# Patient Record
Sex: MALE | Race: WHITE | HISPANIC OR LATINO | URBAN - METROPOLITAN AREA
[De-identification: names, ages, dates, MRNs, and addresses within clinical notes are randomized per-mention and may not be internally consistent; named-entity substitution may affect disease eponyms.]

---

## 2017-10-22 ENCOUNTER — EMERGENCY (EMERGENCY)
Facility: HOSPITAL | Age: 32
LOS: 1 days | End: 2017-10-22
Payer: SELF-PAY

## 2017-10-22 PROCEDURE — 99285 EMERGENCY DEPT VISIT HI MDM: CPT

## 2017-10-22 PROCEDURE — 70450 CT HEAD/BRAIN W/O DYE: CPT | Mod: 26

## 2018-06-18 ENCOUNTER — EMERGENCY (EMERGENCY)
Facility: HOSPITAL | Age: 33
LOS: 1 days | End: 2018-06-18
Payer: SELF-PAY

## 2018-06-18 PROCEDURE — 99283 EMERGENCY DEPT VISIT LOW MDM: CPT

## 2018-07-24 ENCOUNTER — EMERGENCY (EMERGENCY)
Facility: HOSPITAL | Age: 33
LOS: 1 days | End: 2018-07-24
Payer: SELF-PAY

## 2018-07-24 PROCEDURE — 99283 EMERGENCY DEPT VISIT LOW MDM: CPT

## 2018-07-28 ENCOUNTER — EMERGENCY (EMERGENCY)
Facility: HOSPITAL | Age: 33
LOS: 1 days | End: 2018-07-28
Payer: SELF-PAY

## 2018-07-28 PROCEDURE — 99284 EMERGENCY DEPT VISIT MOD MDM: CPT | Mod: 25

## 2018-07-28 PROCEDURE — 99053 MED SERV 10PM-8AM 24 HR FAC: CPT

## 2018-07-30 ENCOUNTER — EMERGENCY (EMERGENCY)
Facility: HOSPITAL | Age: 33
LOS: 1 days | End: 2018-07-30
Payer: SELF-PAY

## 2018-07-30 PROCEDURE — 99284 EMERGENCY DEPT VISIT MOD MDM: CPT | Mod: 25

## 2018-07-30 PROCEDURE — 72131 CT LUMBAR SPINE W/O DYE: CPT | Mod: 26

## 2018-07-30 PROCEDURE — 99053 MED SERV 10PM-8AM 24 HR FAC: CPT

## 2018-10-06 ENCOUNTER — EMERGENCY (EMERGENCY)
Facility: HOSPITAL | Age: 33
LOS: 1 days | End: 2018-10-06
Payer: MEDICAID

## 2018-10-06 PROCEDURE — 99283 EMERGENCY DEPT VISIT LOW MDM: CPT

## 2018-11-21 ENCOUNTER — EMERGENCY (EMERGENCY)
Facility: HOSPITAL | Age: 33
LOS: 1 days | End: 2018-11-21
Payer: MEDICAID

## 2018-11-21 PROCEDURE — 99284 EMERGENCY DEPT VISIT MOD MDM: CPT

## 2019-05-02 ENCOUNTER — EMERGENCY (EMERGENCY)
Facility: HOSPITAL | Age: 34
LOS: 1 days | End: 2019-05-02
Admitting: EMERGENCY MEDICINE
Payer: MEDICAID

## 2019-05-02 PROCEDURE — 99284 EMERGENCY DEPT VISIT MOD MDM: CPT | Mod: 25

## 2019-07-09 PROBLEM — Z00.00 ENCOUNTER FOR PREVENTIVE HEALTH EXAMINATION: Status: ACTIVE | Noted: 2019-07-09

## 2019-07-17 ENCOUNTER — APPOINTMENT (OUTPATIENT)
Dept: ULTRASOUND IMAGING | Facility: CLINIC | Age: 34
End: 2019-07-17

## 2019-11-22 ENCOUNTER — EMERGENCY (EMERGENCY)
Facility: HOSPITAL | Age: 34
LOS: 1 days | End: 2019-11-22
Admitting: EMERGENCY MEDICINE
Payer: MEDICAID

## 2019-11-22 PROCEDURE — 71046 X-RAY EXAM CHEST 2 VIEWS: CPT | Mod: 26

## 2019-11-22 PROCEDURE — 99283 EMERGENCY DEPT VISIT LOW MDM: CPT

## 2019-11-23 PROCEDURE — 93010 ELECTROCARDIOGRAM REPORT: CPT

## 2019-12-11 ENCOUNTER — APPOINTMENT (OUTPATIENT)
Dept: CARDIOLOGY | Facility: CLINIC | Age: 34
End: 2019-12-11
Payer: SELF-PAY

## 2019-12-11 VITALS
HEART RATE: 72 BPM | WEIGHT: 160 LBS | HEIGHT: 65 IN | OXYGEN SATURATION: 97 % | SYSTOLIC BLOOD PRESSURE: 120 MMHG | DIASTOLIC BLOOD PRESSURE: 60 MMHG | BODY MASS INDEX: 26.66 KG/M2

## 2019-12-11 DIAGNOSIS — Z87.891 PERSONAL HISTORY OF NICOTINE DEPENDENCE: ICD-10-CM

## 2019-12-11 DIAGNOSIS — Z78.9 OTHER SPECIFIED HEALTH STATUS: ICD-10-CM

## 2019-12-11 DIAGNOSIS — Z72.3 LACK OF PHYSICAL EXERCISE: ICD-10-CM

## 2019-12-11 PROCEDURE — 99215 OFFICE O/P EST HI 40 MIN: CPT

## 2019-12-11 RX ORDER — IBUPROFEN 600 MG/1
600 TABLET, FILM COATED ORAL DAILY
Refills: 0 | Status: ACTIVE | COMMUNITY
Start: 2019-12-11

## 2019-12-11 NOTE — PHYSICAL EXAM
[General Appearance - Well Developed] : well developed [Normal Appearance] : normal appearance [Well Groomed] : well groomed [No Deformities] : no deformities [General Appearance - In No Acute Distress] : no acute distress [General Appearance - Well Nourished] : well nourished [Eyelids - No Xanthelasma] : the eyelids demonstrated no xanthelasmas [Normal Conjunctiva] : the conjunctiva exhibited no abnormalities [Normal Oral Mucosa] : normal oral mucosa [No Oral Pallor] : no oral pallor [Normal Jugular Venous A Waves Present] : normal jugular venous A waves present [No Oral Cyanosis] : no oral cyanosis [Normal Jugular Venous V Waves Present] : normal jugular venous V waves present [No Jugular Venous Irvin A Waves] : no jugular venous irvin A waves [Heart Rate And Rhythm] : heart rate and rhythm were normal [Heart Sounds] : normal S1 and S2 [Murmurs] : no murmurs present [Respiration, Rhythm And Depth] : normal respiratory rhythm and effort [Exaggerated Use Of Accessory Muscles For Inspiration] : no accessory muscle use [Auscultation Breath Sounds / Voice Sounds] : lungs were clear to auscultation bilaterally [Abdomen Soft] : soft [Abdomen Tenderness] : non-tender [Gait - Sufficient For Exercise Testing] : the gait was sufficient for exercise testing [Abdomen Mass (___ Cm)] : no abdominal mass palpated [Abnormal Walk] : normal gait [Nail Clubbing] : no clubbing of the fingernails [Cyanosis, Localized] : no localized cyanosis [Petechial Hemorrhages (___cm)] : no petechial hemorrhages [Skin Color & Pigmentation] : normal skin color and pigmentation [] : no rash [Skin Lesions] : no skin lesions [No Venous Stasis] : no venous stasis [No Xanthoma] : no  xanthoma was observed [No Skin Ulcers] : no skin ulcer [Oriented To Time, Place, And Person] : oriented to person, place, and time [Affect] : the affect was normal [Mood] : the mood was normal [No Anxiety] : not feeling anxious

## 2019-12-15 NOTE — ASSESSMENT
[FreeTextEntry1] : ASSESSMENT AND PLAN:\par Chest pain, atypical, reassurance.  I have recommended echocardiography for evaluation of LV wall motion and LVEF and regular exercise stress test to see any inducible ischemia.  Risk factor modification has been discussed with him at a great length.  He will be reevaluated by me after cardiac testing.\par

## 2019-12-15 NOTE — HISTORY OF PRESENT ILLNESS
[FreeTextEntry1] : HPI:\par Mr. Mercado is a 34-year-old gentleman, who had chest pain over the precordium, lasting brief duration, no relation, and no other associated symptoms but lasted several days.  He had concerns and went to the emergency room at [facility].  Work up was negative and he was advised to be seen by his cardiologist.\par \par The patient works as a cook, does walk around, does not get any exertional chest pain.  He denies any PND, orthopnea, diabetes, dizziness, palpation, pedal edema, and fogginess.\par \par No prior history of CHF, MI, or syncope.\par \par No past medical history and he is not taking any medications.  He is nonsmoker.\par

## 2019-12-30 ENCOUNTER — APPOINTMENT (OUTPATIENT)
Dept: CARDIOLOGY | Facility: CLINIC | Age: 34
End: 2019-12-30

## 2020-01-22 ENCOUNTER — APPOINTMENT (OUTPATIENT)
Dept: CARDIOLOGY | Facility: CLINIC | Age: 35
End: 2020-01-22

## 2020-02-07 ENCOUNTER — APPOINTMENT (OUTPATIENT)
Dept: CARDIOLOGY | Facility: CLINIC | Age: 35
End: 2020-02-07
Payer: SELF-PAY

## 2020-02-07 PROCEDURE — 93306 TTE W/DOPPLER COMPLETE: CPT

## 2020-02-10 ENCOUNTER — APPOINTMENT (OUTPATIENT)
Dept: CARDIOLOGY | Facility: CLINIC | Age: 35
End: 2020-02-10
Payer: SELF-PAY

## 2020-02-10 PROCEDURE — 93015 CV STRESS TEST SUPVJ I&R: CPT

## 2020-02-24 ENCOUNTER — APPOINTMENT (OUTPATIENT)
Dept: CARDIOLOGY | Facility: CLINIC | Age: 35
End: 2020-02-24
Payer: SELF-PAY

## 2020-02-24 VITALS
DIASTOLIC BLOOD PRESSURE: 66 MMHG | OXYGEN SATURATION: 95 % | SYSTOLIC BLOOD PRESSURE: 100 MMHG | BODY MASS INDEX: 25.83 KG/M2 | WEIGHT: 155 LBS | HEIGHT: 65 IN | HEART RATE: 74 BPM

## 2020-02-24 PROCEDURE — 99214 OFFICE O/P EST MOD 30 MIN: CPT

## 2020-04-27 ENCOUNTER — APPOINTMENT (OUTPATIENT)
Dept: CARDIOLOGY | Facility: CLINIC | Age: 35
End: 2020-04-27
Payer: SELF-PAY

## 2020-04-27 VITALS
TEMPERATURE: 98.4 F | SYSTOLIC BLOOD PRESSURE: 110 MMHG | WEIGHT: 163 LBS | HEART RATE: 70 BPM | BODY MASS INDEX: 27.16 KG/M2 | HEIGHT: 65 IN | DIASTOLIC BLOOD PRESSURE: 80 MMHG

## 2020-04-27 DIAGNOSIS — R07.9 CHEST PAIN, UNSPECIFIED: ICD-10-CM

## 2020-04-27 PROCEDURE — 99214 OFFICE O/P EST MOD 30 MIN: CPT

## 2020-04-27 RX ORDER — METOPROLOL TARTRATE 50 MG/1
50 TABLET, FILM COATED ORAL DAILY
Qty: 90 | Refills: 1 | Status: ACTIVE | COMMUNITY
Start: 2020-02-24 | End: 1900-01-01

## 2020-04-27 RX ORDER — CYCLOBENZAPRINE HYDROCHLORIDE 5 MG/1
5 TABLET, FILM COATED ORAL
Refills: 0 | Status: ACTIVE | COMMUNITY

## 2021-01-06 ENCOUNTER — EMERGENCY (EMERGENCY)
Facility: HOSPITAL | Age: 36
LOS: 1 days | End: 2021-01-06
Admitting: EMERGENCY MEDICINE
Payer: MEDICAID

## 2021-01-06 PROCEDURE — 71045 X-RAY EXAM CHEST 1 VIEW: CPT | Mod: 26

## 2021-01-06 PROCEDURE — 70450 CT HEAD/BRAIN W/O DYE: CPT | Mod: 26

## 2021-01-06 PROCEDURE — 99285 EMERGENCY DEPT VISIT HI MDM: CPT

## 2021-01-06 PROCEDURE — 93010 ELECTROCARDIOGRAM REPORT: CPT

## 2021-04-06 ENCOUNTER — EMERGENCY (EMERGENCY)
Facility: HOSPITAL | Age: 36
LOS: 1 days | End: 2021-04-06
Admitting: EMERGENCY MEDICINE
Payer: MEDICAID

## 2021-04-06 PROCEDURE — 99284 EMERGENCY DEPT VISIT MOD MDM: CPT

## 2021-04-06 PROCEDURE — 72110 X-RAY EXAM L-2 SPINE 4/>VWS: CPT | Mod: 26

## 2021-04-09 ENCOUNTER — EMERGENCY (EMERGENCY)
Facility: HOSPITAL | Age: 36
LOS: 1 days | End: 2021-04-09
Admitting: EMERGENCY MEDICINE
Payer: MEDICAID

## 2021-04-09 PROCEDURE — 99284 EMERGENCY DEPT VISIT MOD MDM: CPT

## 2021-09-04 ENCOUNTER — APPOINTMENT (OUTPATIENT)
Age: 36
End: 2021-09-04

## 2021-09-20 ENCOUNTER — EMERGENCY (EMERGENCY)
Facility: HOSPITAL | Age: 36
LOS: 1 days | End: 2021-09-20
Admitting: EMERGENCY MEDICINE
Payer: MEDICAID

## 2021-09-20 PROCEDURE — 99285 EMERGENCY DEPT VISIT HI MDM: CPT

## 2021-09-21 PROCEDURE — 74177 CT ABD & PELVIS W/CONTRAST: CPT | Mod: 26

## 2021-09-25 ENCOUNTER — APPOINTMENT (OUTPATIENT)
Age: 36
End: 2021-09-25

## 2021-09-29 ENCOUNTER — EMERGENCY (EMERGENCY)
Facility: HOSPITAL | Age: 36
LOS: 1 days | End: 2021-09-29
Admitting: EMERGENCY MEDICINE
Payer: MEDICAID

## 2021-09-29 PROCEDURE — 71045 X-RAY EXAM CHEST 1 VIEW: CPT | Mod: 26

## 2021-09-29 PROCEDURE — 93010 ELECTROCARDIOGRAM REPORT: CPT

## 2021-09-29 PROCEDURE — 99284 EMERGENCY DEPT VISIT MOD MDM: CPT

## 2022-03-10 ENCOUNTER — EMERGENCY (EMERGENCY)
Facility: HOSPITAL | Age: 37
LOS: 1 days | Discharge: HOME | End: 2022-03-10
Admitting: EMERGENCY MEDICINE
Payer: MEDICAID

## 2022-03-10 DIAGNOSIS — R07.9 CHEST PAIN, UNSPECIFIED: ICD-10-CM

## 2022-03-10 PROCEDURE — 71045 X-RAY EXAM CHEST 1 VIEW: CPT | Mod: 26

## 2022-03-10 PROCEDURE — 93010 ELECTROCARDIOGRAM REPORT: CPT

## 2022-03-10 PROCEDURE — 99284 EMERGENCY DEPT VISIT MOD MDM: CPT

## 2022-08-05 ENCOUNTER — EMERGENCY (EMERGENCY)
Facility: HOSPITAL | Age: 37
LOS: 1 days | Discharge: HOME | End: 2022-08-05
Admitting: EMERGENCY MEDICINE

## 2022-08-05 DIAGNOSIS — M54.16 RADICULOPATHY, LUMBAR REGION: ICD-10-CM

## 2022-08-05 DIAGNOSIS — M54.50 LOW BACK PAIN, UNSPECIFIED: ICD-10-CM

## 2022-08-05 PROCEDURE — 99283 EMERGENCY DEPT VISIT LOW MDM: CPT

## 2022-09-03 ENCOUNTER — EMERGENCY (EMERGENCY)
Facility: HOSPITAL | Age: 37
LOS: 1 days | Discharge: HOME | End: 2022-09-03
Admitting: EMERGENCY MEDICINE

## 2022-09-03 DIAGNOSIS — H11.32 CONJUNCTIVAL HEMORRHAGE, LEFT EYE: ICD-10-CM

## 2022-09-03 DIAGNOSIS — H57.89 OTHER SPECIFIED DISORDERS OF EYE AND ADNEXA: ICD-10-CM

## 2022-09-03 PROCEDURE — 99283 EMERGENCY DEPT VISIT LOW MDM: CPT

## 2022-11-11 ENCOUNTER — APPOINTMENT (OUTPATIENT)
Dept: VASCULAR SURGERY | Facility: CLINIC | Age: 37
End: 2022-11-11

## 2022-11-11 VITALS
HEIGHT: 72 IN | WEIGHT: 158 LBS | TEMPERATURE: 97.9 F | SYSTOLIC BLOOD PRESSURE: 90 MMHG | BODY MASS INDEX: 21.4 KG/M2 | DIASTOLIC BLOOD PRESSURE: 68 MMHG

## 2022-11-11 DIAGNOSIS — K35.31 ACUTE APPENDICITIS W/ LOCALIZED PERITONITIS AND GANGRENE,W/O PERFORATION: ICD-10-CM

## 2022-11-11 PROCEDURE — 99024 POSTOP FOLLOW-UP VISIT: CPT

## 2022-11-11 RX ORDER — OXYCODONE 5 MG/1
5 TABLET ORAL
Qty: 10 | Refills: 0 | Status: ACTIVE | COMMUNITY
Start: 2022-11-11 | End: 1900-01-01

## 2022-11-11 NOTE — ASSESSMENT
[FreeTextEntry1] : doing ok postoperatively.\par Reviewed pain control recommendations\par will resend narcotics to pharmacy.  \par follow up PRN.

## 2022-11-11 NOTE — PHYSICAL EXAM
[Normal Breath Sounds] : Normal breath sounds [Normal Heart Sounds] : normal heart sounds [de-identified] : NAD [de-identified] : ERNESTO [de-identified] : soft, tender at the umbilical incision.  ND\par Incisions c/d/i

## 2022-11-11 NOTE — HISTORY OF PRESENT ILLNESS
[de-identified] : 35 yo male s/p lap appendectomy for acute appendicitis with pertionitis.  He is doing well except for pain noted at the umbilicus that is keeping him up at night.  There was an issue a the pharmacy and he was unable to fill his presciption for pain medication and he is currently not taking any.  He denies fevers or chills.  No nausea or vomiting and he is tolerating diet.  He is completing his course of abx.  Pathology demontrates necrotizing appendictiis.

## 2023-09-18 ENCOUNTER — APPOINTMENT (OUTPATIENT)
Dept: GASTROENTEROLOGY | Facility: CLINIC | Age: 38
End: 2023-09-18
Payer: SELF-PAY

## 2023-09-18 DIAGNOSIS — K76.0 FATTY (CHANGE OF) LIVER, NOT ELSEWHERE CLASSIFIED: ICD-10-CM

## 2023-09-18 PROCEDURE — 91200 LIVER ELASTOGRAPHY: CPT

## 2023-12-27 ENCOUNTER — APPOINTMENT (OUTPATIENT)
Dept: RADIOLOGY | Facility: CLINIC | Age: 38
End: 2023-12-27

## 2024-01-12 ENCOUNTER — APPOINTMENT (OUTPATIENT)
Dept: MRI IMAGING | Facility: CLINIC | Age: 39
End: 2024-01-12
Payer: SELF-PAY

## 2024-01-12 PROCEDURE — 72148 MRI LUMBAR SPINE W/O DYE: CPT

## 2024-10-08 ENCOUNTER — APPOINTMENT (OUTPATIENT)
Dept: CARDIOLOGY | Facility: CLINIC | Age: 39
End: 2024-10-08
Payer: SELF-PAY

## 2024-10-08 VITALS
BODY MASS INDEX: 23.06 KG/M2 | OXYGEN SATURATION: 96 % | WEIGHT: 170 LBS | DIASTOLIC BLOOD PRESSURE: 76 MMHG | HEART RATE: 65 BPM | SYSTOLIC BLOOD PRESSURE: 96 MMHG

## 2024-10-08 DIAGNOSIS — R07.9 CHEST PAIN, UNSPECIFIED: ICD-10-CM

## 2024-10-08 PROCEDURE — 99204 OFFICE O/P NEW MOD 45 MIN: CPT

## 2024-10-08 PROCEDURE — 93000 ELECTROCARDIOGRAM COMPLETE: CPT

## 2024-10-14 ENCOUNTER — NON-APPOINTMENT (OUTPATIENT)
Age: 39
End: 2024-10-14

## 2024-10-15 ENCOUNTER — APPOINTMENT (OUTPATIENT)
Dept: CARDIOLOGY | Facility: CLINIC | Age: 39
End: 2024-10-15

## 2025-06-04 ENCOUNTER — NON-APPOINTMENT (OUTPATIENT)
Age: 40
End: 2025-06-04

## 2025-06-06 ENCOUNTER — APPOINTMENT (OUTPATIENT)
Dept: NEUROSURGERY | Facility: CLINIC | Age: 40
End: 2025-06-06
Payer: SELF-PAY

## 2025-06-06 VITALS
HEIGHT: 68 IN | WEIGHT: 170 LBS | SYSTOLIC BLOOD PRESSURE: 124 MMHG | DIASTOLIC BLOOD PRESSURE: 76 MMHG | BODY MASS INDEX: 25.76 KG/M2

## 2025-06-06 PROCEDURE — 99203 OFFICE O/P NEW LOW 30 MIN: CPT

## 2025-06-06 RX ORDER — NAPROXEN 500 MG/1
500 TABLET ORAL
Qty: 60 | Refills: 0 | Status: ACTIVE | COMMUNITY
Start: 2025-06-06 | End: 1900-01-01

## 2025-06-19 ENCOUNTER — APPOINTMENT (OUTPATIENT)
Dept: MRI IMAGING | Facility: CLINIC | Age: 40
End: 2025-06-19

## 2025-06-19 PROCEDURE — 72148 MRI LUMBAR SPINE W/O DYE: CPT

## 2025-06-30 ENCOUNTER — APPOINTMENT (OUTPATIENT)
Dept: NEUROSURGERY | Facility: CLINIC | Age: 40
End: 2025-06-30
Payer: SELF-PAY

## 2025-06-30 PROCEDURE — 99213 OFFICE O/P EST LOW 20 MIN: CPT

## 2025-06-30 RX ORDER — TRAMADOL HYDROCHLORIDE 50 MG/1
50 TABLET, COATED ORAL
Qty: 21 | Refills: 0 | Status: ACTIVE | COMMUNITY
Start: 2025-06-30 | End: 1900-01-01

## 2025-07-04 ENCOUNTER — EMERGENCY (EMERGENCY)
Facility: HOSPITAL | Age: 40
LOS: 1 days | End: 2025-07-04
Attending: STUDENT IN AN ORGANIZED HEALTH CARE EDUCATION/TRAINING PROGRAM
Payer: MEDICAID

## 2025-07-04 VITALS
RESPIRATION RATE: 18 BRPM | HEART RATE: 60 BPM | SYSTOLIC BLOOD PRESSURE: 124 MMHG | OXYGEN SATURATION: 98 % | TEMPERATURE: 98 F | DIASTOLIC BLOOD PRESSURE: 75 MMHG

## 2025-07-04 PROCEDURE — 99284 EMERGENCY DEPT VISIT MOD MDM: CPT

## 2025-07-04 PROCEDURE — T1013: CPT

## 2025-07-04 PROCEDURE — 99282 EMERGENCY DEPT VISIT SF MDM: CPT

## 2025-07-04 RX ORDER — METHOCARBAMOL 500 MG/1
2 TABLET, FILM COATED ORAL
Qty: 30 | Refills: 0
Start: 2025-07-04 | End: 2025-07-08

## 2025-07-04 RX ORDER — KETOROLAC TROMETHAMINE 30 MG/ML
15 INJECTION, SOLUTION INTRAMUSCULAR; INTRAVENOUS ONCE
Refills: 0 | Status: DISCONTINUED | OUTPATIENT
Start: 2025-07-04 | End: 2025-07-04

## 2025-07-04 RX ORDER — LIDOCAINE HYDROCHLORIDE 20 MG/ML
1 JELLY TOPICAL
Qty: 1 | Refills: 0
Start: 2025-07-04 | End: 2025-07-08

## 2025-07-04 RX ORDER — IBUPROFEN 200 MG
1 TABLET ORAL
Qty: 21 | Refills: 0
Start: 2025-07-04 | End: 2025-07-10

## 2025-07-04 NOTE — ED PROVIDER NOTE - PATIENT PORTAL LINK FT
You can access the FollowMyHealth Patient Portal offered by [facility] by registering at the following website: [email or web address]. By joining [employer]’s FollowMyHealth portal, you will also be able to view your health information using other applications (apps) compatible with our system.

## 2025-07-04 NOTE — ED PROVIDER NOTE - ATTENDING CONTRIBUTION TO CARE
39M presenting with acute on chronic lower back pain, has had MR of his spine, is following with spine and is currently scheduled for back surgery. Had workup at [facility]. Has not had any weakness/numbness, is having pain as well as muscle spasms. Has still been ambulatory, no incontinence of bowel or bladder. On exam equal strength BLE, bilateral paraspinal lower back pain without midline ttp, ambulatory without difficulty, lungs ctab, hr normal, rhythm regular. Discussed with patient that we would not be able to expedite his surgery today given no worsening symptoms should maintain his appointment, will provide analgesics + msk relaxant, did offer dose of IM toradol here though patient declines states would prefer to go outpt and just take oral meds. Will d/c with plan to keep outpt appointments, return precautions d/w patient with  Joe.     Gen: NAD, non-toxic, conversational  Eyes: PERRLA, EOMI   HENT: Normocephalic, atraumatic. External ears normal, no rhinorrhea, moist mucous membranes.   CV: RRR, no M/R/G  Resp: CTAB, non-labored, speaking without difficulty on room air  Abd: soft, non tender, non rigid, no guarding or rebound tenderness  Back: No CVAT bilaterally, no midline ttp, +paraspinal lumbar ttp bilaterally   Skin: dry, wwp   Neuro: AOx3, speech is fluent and appropriate, motor 5/5 & symmetric in BUE/BLE, sensation grossly intact, gait nl  Psych: Mood ok, affect euthymic

## 2025-07-04 NOTE — ED ADULT NURSE NOTE - OBJECTIVE STATEMENT
Pt is a 39y M, AOx4, presenting to [facility] w/lower back pain. Pt states he has been experiencing sx for 4 weeks and that pain is worse when he is working/moving extraneously. Pt has had outpt CT scan, MRI, and XR, pt is following up with ortho outpt. Pt denies any other complaints at this time. Resp even and unlabored. Bed locked and in lowest position.

## 2025-07-04 NOTE — ED ADULT TRIAGE NOTE - CHIEF COMPLAINT QUOTE
Pt having back pain that radiates to legs for 4 weeks that is getting gradually worse. Pt has had no deficits, but has soreness with walking.

## 2025-07-04 NOTE — ED PROVIDER NOTE - PHYSICAL EXAMINATION
Generalt: Pt is well appearing. In no acute distress.   HEENT: Oropharynx clear, Moist mucous membranes  Eyes: PERRL  Neck:. Trachea midline  Cardiac: Regular rate and regular rhythm. +S1/S2. No murmurs, rubs or gallops.  Resp: Speaking in full sentences, breath sounds equal and clear bilaterally. No wheezes, rales or rhonchi.  Abd: Soft, non-tender, non-distended.  No guarding or rebound.  MSK:  no midline cervical, thoracic, or lumbar spinal tenderness.  No overlying rashes.  Full range of motion of the lumbar spine.  2+ patellar and ankle reflexes bilaterally.   Skin: No rashes, abrasions or lacerations.  Neuro:   5 out of 5 strength, bilateral upper and lower extremities.  Sensation intact throughout

## 2025-07-04 NOTE — ED PROVIDER NOTE - CLINICAL SUMMARY MEDICAL DECISION MAKING FREE TEXT BOX
39-year-old male patient with known history of spinal stenosis, has outpatient presurgical testing scheduled in the upcoming weeks presents the ED complaining of chronic lower back pain, requesting if possible to have surgery today.  Patient has no exacerbation of symptoms, no fevers, no rashes, no numbness or weakness, no bowel or bladder incontinence, no evidence of cord compression.  No history of diabetes, no IV drug use.  On exam, patient well-appearing, ambulating without assistance, 5 out of 5 strength, bilateral upper and lower extremities.  Normal patellar and ankle reflexes bilaterally.  Had at length discussion with  at bedside explained to patient that back surgery given no cord compression is an elective procedure and that this needs to be scheduled outpatient with presurgical testing.  Patient has scheduled appointment, is able to follow-up with the spine surgeon which she has an appointment with on Monday.  Pain medication sent to pharmacy.  Return precautions given with strict signs and symptoms to watch for.  All conversations held with ED  at bedside.  Patient safe for discharge home

## 2025-07-04 NOTE — ED PROVIDER NOTE - CARE PROVIDER_API CALL
Diamond Boyd  Neurological Surgery  [address] [place] [id number]  Phone: [phone]  Fax: [phone]  Follow Up Time: Urgent

## 2025-07-04 NOTE — ED PROVIDER NOTE - ADDITIONAL NOTES AND INSTRUCTIONS:
To whom it may concern,     This  patient was evaluated at [facility] [place], and was deemed to require time off from work/school. They may return on the mentioned date below. If you have any questions or concerns, you may contact the emergency department at [facility] [place].  Thank you.     Dr. William Wiedemann, DO

## 2025-07-04 NOTE — ED PROVIDER NOTE - OBJECTIVE STATEMENT
39-year-old male patient with no past medical history presents the ED complaining of left lower back pain.  Patient states for the past 5 weeks he  has been experiencing left lower back pain, radiating down his left leg.  Patient was seen at [facility], had an outpatient MRI showing L4-L5 spinal stenosis.  Patient is following with neurosurgery, has scheduled presurgical testing, but comes in today  seeing if it is possible to get surgery today for her symptoms.  Patient reports chronic pain, no numbness, weakness, difficulty urinating, no urine or bowel incontinence, no fevers, no rashes.  Patient has been taking pain medication at home.  no history of diabetes, no IV drug use

## 2025-07-04 NOTE — ED PROVIDER NOTE - NSFOLLOWUPINSTRUCTIONS_ED_ALL_ED_FT
Asegúrese de hacer un seguimiento con solares cirujano de columna sobre mariela síntomas. Gwinn los medicamentos que le enviaron a solares farmacia según lo prescrito. Si el dolor de espalda, los síntomas o las inquietudes empeoran, regrese a urgencia    Dolor de Espalda    El dolor de espalda es muy común en adultos. Solares causa trini vez es peligrosa y suele mejorar con el tiempo. Es posible que se desconozca la causa del dolor de espalda, que puede incluir distensión muscular o ligamentosa, degeneración de los discos intervertebrales o artritis. En ocasiones, el dolor puede irradiarse a la(s) pierna(s). Los medicamentos de venta sofiya para reducir el dolor y la inflamación suelen ser los más eficaces. Estirar y mantenerse activo con frecuencia contribuye al proceso de curación.    BUSQUE ATENCIÓN MÉDICA INMEDIATA SI PRESENTA ALGUNO DE LOS SIGUIENTES SÍNTOMAS: problemas de control intestinal o urinario, debilidad o entumecimiento inusual en brazos o piernas, náuseas o vómitos, dolor abdominal, fiebre, mareos/aturdimiento.
